# Patient Record
Sex: MALE | Race: WHITE | NOT HISPANIC OR LATINO
[De-identification: names, ages, dates, MRNs, and addresses within clinical notes are randomized per-mention and may not be internally consistent; named-entity substitution may affect disease eponyms.]

---

## 2017-10-09 PROBLEM — Z00.00 ENCOUNTER FOR PREVENTIVE HEALTH EXAMINATION: Status: ACTIVE | Noted: 2017-10-09

## 2017-10-16 ENCOUNTER — FORM ENCOUNTER (OUTPATIENT)
Age: 62
End: 2017-10-16

## 2017-10-17 ENCOUNTER — APPOINTMENT (OUTPATIENT)
Dept: RADIOLOGY | Facility: CLINIC | Age: 62
End: 2017-10-17

## 2017-10-17 ENCOUNTER — OUTPATIENT (OUTPATIENT)
Dept: OUTPATIENT SERVICES | Facility: HOSPITAL | Age: 62
LOS: 1 days | End: 2017-10-17
Payer: COMMERCIAL

## 2017-10-17 ENCOUNTER — APPOINTMENT (OUTPATIENT)
Dept: ORTHOPEDIC SURGERY | Facility: CLINIC | Age: 62
End: 2017-10-17
Payer: COMMERCIAL

## 2017-10-17 VITALS — BODY MASS INDEX: 32.9 KG/M2 | HEIGHT: 71 IN | RESPIRATION RATE: 16 BRPM | WEIGHT: 235 LBS

## 2017-10-17 DIAGNOSIS — Z86.39 PERSONAL HISTORY OF OTHER ENDOCRINE, NUTRITIONAL AND METABOLIC DISEASE: ICD-10-CM

## 2017-10-17 DIAGNOSIS — Z82.62 FAMILY HISTORY OF OSTEOPOROSIS: ICD-10-CM

## 2017-10-17 DIAGNOSIS — Z87.39 PERSONAL HISTORY OF OTHER DISEASES OF THE MUSCULOSKELETAL SYSTEM AND CONNECTIVE TISSUE: ICD-10-CM

## 2017-10-17 DIAGNOSIS — Z80.3 FAMILY HISTORY OF MALIGNANT NEOPLASM OF BREAST: ICD-10-CM

## 2017-10-17 DIAGNOSIS — Z82.69 FAMILY HISTORY OF OTHER DISEASES OF THE MUSCULOSKELETAL SYSTEM AND CONNECTIVE TISSUE: ICD-10-CM

## 2017-10-17 DIAGNOSIS — Z86.79 PERSONAL HISTORY OF OTHER DISEASES OF THE CIRCULATORY SYSTEM: ICD-10-CM

## 2017-10-17 DIAGNOSIS — Z86.69 PERSONAL HISTORY OF OTHER DISEASES OF THE NERVOUS SYSTEM AND SENSE ORGANS: ICD-10-CM

## 2017-10-17 PROCEDURE — 73030 X-RAY EXAM OF SHOULDER: CPT | Mod: 26,LT

## 2017-10-17 PROCEDURE — 99213 OFFICE O/P EST LOW 20 MIN: CPT

## 2017-10-17 RX ORDER — VALSARTAN 40 MG/1
TABLET, COATED ORAL
Refills: 0 | Status: ACTIVE | COMMUNITY

## 2017-10-17 RX ORDER — ALPRAZOLAM 2 MG/1
TABLET ORAL
Refills: 0 | Status: ACTIVE | COMMUNITY

## 2018-05-15 ENCOUNTER — APPOINTMENT (OUTPATIENT)
Dept: ORTHOPEDIC SURGERY | Facility: CLINIC | Age: 63
End: 2018-05-15
Payer: COMMERCIAL

## 2018-05-15 VITALS — HEIGHT: 71 IN | WEIGHT: 219 LBS | BODY MASS INDEX: 30.66 KG/M2

## 2018-05-15 PROCEDURE — 99214 OFFICE O/P EST MOD 30 MIN: CPT

## 2018-05-15 RX ORDER — HYDROCODONE BITARTRATE AND ACETAMINOPHEN 7.5; 3 MG/1; MG/1
TABLET ORAL
Refills: 0 | Status: ACTIVE | COMMUNITY

## 2018-05-15 RX ORDER — AMITRIPTYLINE HYDROCHLORIDE 75 MG/1
TABLET, FILM COATED ORAL
Refills: 0 | Status: ACTIVE | COMMUNITY

## 2018-07-03 ENCOUNTER — APPOINTMENT (OUTPATIENT)
Dept: ORTHOPEDIC SURGERY | Facility: CLINIC | Age: 63
End: 2018-07-03
Payer: COMMERCIAL

## 2018-07-03 VITALS — WEIGHT: 219 LBS | BODY MASS INDEX: 30.66 KG/M2 | HEIGHT: 71 IN

## 2018-07-03 PROCEDURE — 99214 OFFICE O/P EST MOD 30 MIN: CPT

## 2018-07-19 ENCOUNTER — TRANSCRIPTION ENCOUNTER (OUTPATIENT)
Age: 63
End: 2018-07-19

## 2018-07-25 ENCOUNTER — APPOINTMENT (OUTPATIENT)
Age: 63
End: 2018-07-25
Payer: COMMERCIAL

## 2018-07-25 PROCEDURE — 64718 REVISE ULNAR NERVE AT ELBOW: CPT | Mod: RT

## 2018-08-03 ENCOUNTER — APPOINTMENT (OUTPATIENT)
Dept: ORTHOPEDIC SURGERY | Facility: CLINIC | Age: 63
End: 2018-08-03
Payer: COMMERCIAL

## 2018-08-03 VITALS — WEIGHT: 219 LBS | BODY MASS INDEX: 30.66 KG/M2 | HEIGHT: 71 IN

## 2018-08-03 PROCEDURE — 99024 POSTOP FOLLOW-UP VISIT: CPT

## 2018-09-04 ENCOUNTER — APPOINTMENT (OUTPATIENT)
Dept: ORTHOPEDIC SURGERY | Facility: CLINIC | Age: 63
End: 2018-09-04
Payer: COMMERCIAL

## 2018-09-04 VITALS — BODY MASS INDEX: 30.66 KG/M2 | WEIGHT: 219 LBS | HEIGHT: 71 IN

## 2018-09-04 PROCEDURE — 99024 POSTOP FOLLOW-UP VISIT: CPT

## 2018-09-04 RX ORDER — OXYCODONE AND ACETAMINOPHEN 5; 325 MG/1; MG/1
5-325 TABLET ORAL
Qty: 40 | Refills: 0 | Status: DISCONTINUED | COMMUNITY
Start: 2018-07-17 | End: 2018-09-04

## 2018-12-11 ENCOUNTER — APPOINTMENT (OUTPATIENT)
Dept: ORTHOPEDIC SURGERY | Facility: CLINIC | Age: 63
End: 2018-12-11
Payer: COMMERCIAL

## 2018-12-11 VITALS — WEIGHT: 219 LBS | HEIGHT: 71 IN | BODY MASS INDEX: 30.66 KG/M2

## 2018-12-11 PROCEDURE — 99212 OFFICE O/P EST SF 10 MIN: CPT

## 2019-04-01 ENCOUNTER — FORM ENCOUNTER (OUTPATIENT)
Age: 64
End: 2019-04-01

## 2019-04-02 ENCOUNTER — OUTPATIENT (OUTPATIENT)
Dept: OUTPATIENT SERVICES | Facility: HOSPITAL | Age: 64
LOS: 1 days | End: 2019-04-02
Payer: COMMERCIAL

## 2019-04-02 ENCOUNTER — APPOINTMENT (OUTPATIENT)
Dept: RADIOLOGY | Facility: CLINIC | Age: 64
End: 2019-04-02

## 2019-04-02 ENCOUNTER — APPOINTMENT (OUTPATIENT)
Dept: ORTHOPEDIC SURGERY | Facility: CLINIC | Age: 64
End: 2019-04-02
Payer: COMMERCIAL

## 2019-04-02 VITALS — BODY MASS INDEX: 30.8 KG/M2 | HEIGHT: 71 IN | WEIGHT: 220 LBS

## 2019-04-02 PROCEDURE — 73030 X-RAY EXAM OF SHOULDER: CPT

## 2019-04-02 PROCEDURE — 73030 X-RAY EXAM OF SHOULDER: CPT | Mod: 26,RT

## 2019-04-02 PROCEDURE — 99213 OFFICE O/P EST LOW 20 MIN: CPT

## 2019-04-02 NOTE — PHYSICAL EXAM
[de-identified] : The right shoulder has 140° passive forward elevation, 40° external rotation. There is pain at the extremes of motion and he has normal strength of external rotation. There is slight crepitus with passive rotation of the glenohumeral joint.\par \par The right elbow has full active and passive range of motion. He has full motor power of the right hand but he has obvious intrinsic atrophy which has not changed from the preoperative state.\par \par The less symptomatic left shoulder has 140° passive forward elevation, 40° external rotation. There is no crepitus and no pain with passive rotation of the left shoulder.

## 2019-04-02 NOTE — REASON FOR VISIT
[Follow-Up Visit] : a follow-up visit for [FreeTextEntry2] : Right Shoulder Pain for 1 year, Left Shoulder Pain for 12 years

## 2019-04-02 NOTE — HISTORY OF PRESENT ILLNESS
[de-identified] : Mr. Richardson visits us today 8 months following right anterior ulnar nerve transposition on 7-25-18.He states that the right arm feels quite a bit better and he has more strength in the forearm and hand but he continues to have decreased sensation in the ulnar distribution in the fourth and fifth digits. Overall he feels as though the right ulnar transposition surgery has helped him.\par \par He returns today complaining of increasing right shoulder pain neck compromise of sleep and activities of daily living. He takes Aleve and Tylenol which did not relieve his pain.\par \par He is 14 years status post left total shoulder arthroplasty and he states that the left shoulder is functioning well and he has no pain. However, he is limited in the amount that he can lift overhead which does cause pain.

## 2019-04-08 ENCOUNTER — FORM ENCOUNTER (OUTPATIENT)
Age: 64
End: 2019-04-08

## 2019-04-09 ENCOUNTER — APPOINTMENT (OUTPATIENT)
Dept: MRI IMAGING | Facility: CLINIC | Age: 64
End: 2019-04-09

## 2019-04-09 ENCOUNTER — OTHER (OUTPATIENT)
Age: 64
End: 2019-04-09

## 2019-04-09 ENCOUNTER — APPOINTMENT (OUTPATIENT)
Dept: ORTHOPEDIC SURGERY | Facility: CLINIC | Age: 64
End: 2019-04-09
Payer: COMMERCIAL

## 2019-04-09 ENCOUNTER — OUTPATIENT (OUTPATIENT)
Dept: OUTPATIENT SERVICES | Facility: HOSPITAL | Age: 64
LOS: 1 days | End: 2019-04-09
Payer: COMMERCIAL

## 2019-04-09 DIAGNOSIS — M19.012 PRIMARY OSTEOARTHRITIS, LEFT SHOULDER: ICD-10-CM

## 2019-04-09 PROCEDURE — 99214 OFFICE O/P EST MOD 30 MIN: CPT

## 2019-04-09 PROCEDURE — 73221 MRI JOINT UPR EXTREM W/O DYE: CPT

## 2019-04-09 PROCEDURE — 73221 MRI JOINT UPR EXTREM W/O DYE: CPT | Mod: 26,RT

## 2019-04-14 NOTE — REASON FOR VISIT
[Follow-Up Visit] : a follow-up visit for [FreeTextEntry2] : Right Shoulder Pain for 1 year, Left Shoulder Pain for 12 years.

## 2019-04-14 NOTE — PHYSICAL EXAM
[de-identified] : The right shoulder has 140° passive forward elevation, 40° external rotation. He has normal strength of external rotation and can fully actively raise the arm but this is painful. He has painful crepitus with passive rotation of the glenohumeral joint.

## 2019-04-14 NOTE — HISTORY OF PRESENT ILLNESS
[de-identified] : Pete returns today to discuss the indications for total shoulder arthroplasty. He had an MRI today that show advanced osteoarthritis of the right glenohumeral joint with an intact rotator cuff.\par \par He continues to have pain in the right shoulder the compromise of sleep and limits activities of daily living that require overhead use. He has had progressing pain for the last year unresponsive to a full nonoperative treatment program. Radiographs of the right shoulder show end-stage osteoarthritis.

## 2019-05-10 VITALS
DIASTOLIC BLOOD PRESSURE: 96 MMHG | TEMPERATURE: 99 F | SYSTOLIC BLOOD PRESSURE: 153 MMHG | WEIGHT: 216.27 LBS | HEART RATE: 73 BPM | HEIGHT: 71 IN | OXYGEN SATURATION: 96 % | RESPIRATION RATE: 20 BRPM

## 2019-05-10 NOTE — PRE-OP CHECKLIST - INTERNAL PROSTHESES
METAL HARDWARE-BILATERAL HIP METAL HARDWARE, CERVICAL SPINE METAL RODS, LEFT SHOULDER METAL PLATE, LEFT HAND PINKEY/yes(specify)

## 2019-05-10 NOTE — PRE-OP CHECKLIST - 1.
METAL HARDWARE-BILATERAL HIP METAL HARDWARE, CERVICAL SPINE METAL RODS, LEFT SHOULDER METAL PLATE, LEFT HAND PINKEY SCREWS AND LEFT ELBOW METAL PLATE

## 2019-05-10 NOTE — PATIENT PROFILE ADULT - NSPROIMPLANTSMEDDEV_GEN_A_NUR
Artificial joint Artificial joint/Bilateral metal hardware hips  cervical spine 0 metal rods  left shoulder- metal plate  left hand pinky- metal screws  left elbow- metal plate

## 2019-05-13 ENCOUNTER — INPATIENT (INPATIENT)
Facility: HOSPITAL | Age: 64
LOS: 0 days | Discharge: HOME | DRG: 483 | End: 2019-05-14
Attending: ORTHOPAEDIC SURGERY | Admitting: ORTHOPAEDIC SURGERY
Payer: COMMERCIAL

## 2019-05-13 ENCOUNTER — APPOINTMENT (OUTPATIENT)
Dept: ORTHOPEDIC SURGERY | Facility: HOSPITAL | Age: 64
End: 2019-05-13

## 2019-05-13 DIAGNOSIS — I10 ESSENTIAL (PRIMARY) HYPERTENSION: ICD-10-CM

## 2019-05-13 DIAGNOSIS — Z98.890 OTHER SPECIFIED POSTPROCEDURAL STATES: Chronic | ICD-10-CM

## 2019-05-13 DIAGNOSIS — E78.49 OTHER HYPERLIPIDEMIA: ICD-10-CM

## 2019-05-13 DIAGNOSIS — M19.011 PRIMARY OSTEOARTHRITIS, RIGHT SHOULDER: ICD-10-CM

## 2019-05-13 DIAGNOSIS — F41.9 ANXIETY DISORDER, UNSPECIFIED: ICD-10-CM

## 2019-05-13 LAB
HCT VFR BLD CALC: 40.7 % — SIGNIFICANT CHANGE UP (ref 39–50)
HGB BLD-MCNC: 13.9 G/DL — SIGNIFICANT CHANGE UP (ref 13–17)
MCHC RBC-ENTMCNC: 30.3 PG — SIGNIFICANT CHANGE UP (ref 27–34)
MCHC RBC-ENTMCNC: 34.2 GM/DL — SIGNIFICANT CHANGE UP (ref 32–36)
MCV RBC AUTO: 88.7 FL — SIGNIFICANT CHANGE UP (ref 80–100)
NRBC # BLD: 0 /100 WBCS — SIGNIFICANT CHANGE UP (ref 0–0)
PLATELET # BLD AUTO: 224 K/UL — SIGNIFICANT CHANGE UP (ref 150–400)
RBC # BLD: 4.59 M/UL — SIGNIFICANT CHANGE UP (ref 4.2–5.8)
RBC # FLD: 13.6 % — SIGNIFICANT CHANGE UP (ref 10.3–14.5)
WBC # BLD: 16.36 K/UL — HIGH (ref 3.8–10.5)
WBC # FLD AUTO: 16.36 K/UL — HIGH (ref 3.8–10.5)

## 2019-05-13 PROCEDURE — 20902 REMOVAL OF BONE FOR GRAFT: CPT

## 2019-05-13 PROCEDURE — 23472 RECONSTRUCT SHOULDER JOINT: CPT | Mod: RT

## 2019-05-13 PROCEDURE — 73020 X-RAY EXAM OF SHOULDER: CPT | Mod: 26,RT

## 2019-05-13 RX ORDER — ACETAMINOPHEN 500 MG
1000 TABLET ORAL EVERY 6 HOURS
Refills: 0 | Status: DISCONTINUED | OUTPATIENT
Start: 2019-05-13 | End: 2019-05-14

## 2019-05-13 RX ORDER — CEFAZOLIN SODIUM 1 G
2000 VIAL (EA) INJECTION EVERY 8 HOURS
Refills: 0 | Status: DISCONTINUED | OUTPATIENT
Start: 2019-05-13 | End: 2019-05-13

## 2019-05-13 RX ORDER — LOSARTAN POTASSIUM 100 MG/1
100 TABLET, FILM COATED ORAL DAILY
Refills: 0 | Status: DISCONTINUED | OUTPATIENT
Start: 2019-05-13 | End: 2019-05-13

## 2019-05-13 RX ORDER — SENNA PLUS 8.6 MG/1
2 TABLET ORAL AT BEDTIME
Refills: 0 | Status: DISCONTINUED | OUTPATIENT
Start: 2019-05-13 | End: 2019-05-14

## 2019-05-13 RX ORDER — ALPRAZOLAM 0.25 MG
0.5 TABLET ORAL THREE TIMES A DAY
Refills: 0 | Status: DISCONTINUED | OUTPATIENT
Start: 2019-05-13 | End: 2019-05-14

## 2019-05-13 RX ORDER — LOSARTAN POTASSIUM 100 MG/1
100 TABLET, FILM COATED ORAL DAILY
Refills: 0 | Status: DISCONTINUED | OUTPATIENT
Start: 2019-05-13 | End: 2019-05-14

## 2019-05-13 RX ORDER — OXYCODONE HYDROCHLORIDE 5 MG/1
10 TABLET ORAL EVERY 4 HOURS
Refills: 0 | Status: DISCONTINUED | OUTPATIENT
Start: 2019-05-13 | End: 2019-05-14

## 2019-05-13 RX ORDER — DOCUSATE SODIUM 100 MG
100 CAPSULE ORAL THREE TIMES A DAY
Refills: 0 | Status: DISCONTINUED | OUTPATIENT
Start: 2019-05-13 | End: 2019-05-14

## 2019-05-13 RX ORDER — LOSARTAN POTASSIUM 100 MG/1
1 TABLET, FILM COATED ORAL
Qty: 0 | Refills: 0 | DISCHARGE

## 2019-05-13 RX ORDER — BUPIVACAINE 13.3 MG/ML
20 INJECTION, SUSPENSION, LIPOSOMAL INFILTRATION ONCE
Refills: 0 | Status: DISCONTINUED | OUTPATIENT
Start: 2019-05-13 | End: 2019-05-14

## 2019-05-13 RX ORDER — KETOROLAC TROMETHAMINE 30 MG/ML
15 SYRINGE (ML) INJECTION EVERY 8 HOURS
Refills: 0 | Status: DISCONTINUED | OUTPATIENT
Start: 2019-05-13 | End: 2019-05-14

## 2019-05-13 RX ORDER — OXYCODONE HYDROCHLORIDE 5 MG/1
5 TABLET ORAL EVERY 4 HOURS
Refills: 0 | Status: DISCONTINUED | OUTPATIENT
Start: 2019-05-13 | End: 2019-05-14

## 2019-05-13 RX ORDER — ALPRAZOLAM 0.25 MG
1 TABLET ORAL
Qty: 0 | Refills: 0 | DISCHARGE

## 2019-05-13 RX ORDER — POLYETHYLENE GLYCOL 3350 17 G/17G
17 POWDER, FOR SOLUTION ORAL DAILY
Refills: 0 | Status: DISCONTINUED | OUTPATIENT
Start: 2019-05-13 | End: 2019-05-14

## 2019-05-13 RX ORDER — MORPHINE SULFATE 50 MG/1
4 CAPSULE, EXTENDED RELEASE ORAL
Refills: 0 | Status: DISCONTINUED | OUTPATIENT
Start: 2019-05-13 | End: 2019-05-14

## 2019-05-13 RX ORDER — SODIUM CHLORIDE 9 MG/ML
1000 INJECTION, SOLUTION INTRAVENOUS
Refills: 0 | Status: DISCONTINUED | OUTPATIENT
Start: 2019-05-13 | End: 2019-05-14

## 2019-05-13 RX ORDER — CEFAZOLIN SODIUM 1 G
2000 VIAL (EA) INJECTION EVERY 8 HOURS
Refills: 0 | Status: COMPLETED | OUTPATIENT
Start: 2019-05-13 | End: 2019-05-14

## 2019-05-13 RX ORDER — ALPRAZOLAM 0.25 MG
0.5 TABLET ORAL THREE TIMES A DAY
Refills: 0 | Status: DISCONTINUED | OUTPATIENT
Start: 2019-05-13 | End: 2019-05-13

## 2019-05-13 RX ORDER — ONDANSETRON 8 MG/1
4 TABLET, FILM COATED ORAL EVERY 6 HOURS
Refills: 0 | Status: DISCONTINUED | OUTPATIENT
Start: 2019-05-13 | End: 2019-05-14

## 2019-05-13 RX ADMIN — Medication 15 MILLIGRAM(S): at 21:15

## 2019-05-13 RX ADMIN — Medication 2000 MILLIGRAM(S): at 21:14

## 2019-05-13 RX ADMIN — Medication 100 MILLIGRAM(S): at 21:15

## 2019-05-13 RX ADMIN — Medication 15 MILLIGRAM(S): at 21:30

## 2019-05-13 NOTE — H&P ADULT - NSHPPHYSICALEXAM_GEN_ALL_CORE
MSK: + joint pain, right shoulder MSK: + joint pain, right shoulder  Sensation intact to bilateral UE distally, diminished throughout right hand. Motor Strength 5/5 to /interossei/triceps/biceps/deltoid bilaterally. AIN/PIN intact.   Skin warm and well perfused; no visible wounds/erythema/ecchymoses  Radial pulses 2+    Remainder of exam per medical clearance note

## 2019-05-13 NOTE — H&P ADULT - PROBLEM SELECTOR PLAN 1
Admit to Orthopaedic Service.  Presents today for elective right TSR  Pt medically stable and cleared for procedure today by  Admit to Orthopaedic Service.  Presents today for elective right TSR  Pt medically stable and cleared for procedure today by Dr. Skinner

## 2019-05-13 NOTE — H&P ADULT - NSHPLABSRESULTS_GEN_ALL_CORE
Preop CBC, BMP, PT/PTT/INR - WNL per medical clearance   Preop EKG - Afib - reviewed by medical clearance   Echo April 2017 EF 65-70%

## 2019-05-13 NOTE — PROGRESS NOTE ADULT - SUBJECTIVE AND OBJECTIVE BOX
Ortho Post Op Check    Procedure: R NETO  Surgeon: Dariana    Pt comfortable without complaints, pain controlled  Denies CP, SOB, N/V, numbness/tingling     Vital Signs Last 24 Hrs  T(C): 35.8 (05-13-19 @ 20:10), Max: 35.8 (05-13-19 @ 20:10)  T(F): 96.5 (05-13-19 @ 20:10), Max: 96.5 (05-13-19 @ 20:10)  HR: 98 (05-13-19 @ 20:10) (97 - 98)  BP: 139/99 (05-13-19 @ 20:10) (139/99 - 141/90)  BP(mean): --  RR: 15 (05-13-19 @ 20:10) (14 - 15)  SpO2: 94% (05-13-19 @ 19:39) (94% - 94%)  AVSS    General: Pt Alert and oriented, NAD  DSG C/D/I, sling intact  Pulses: 2+ rad  Sensation:  SILT med/uln (rad returning from block)   Motor: wiggling fingers but block still in effect                          13.9   16.36 )-----------( 224      ( 13 May 2019 16:49 )             40.7       Post-op X-Ray: prosthesis intact     A/P: 64yMale s/p R TSA   - f/u Neuro exam   - Stable  - Pain Control  - DVT ppx: SCDs  - Post op abx: Ancef   - PT, WBS:  NWB RUE     Ortho Pager [phone]

## 2019-05-13 NOTE — CONSULT NOTE ADULT - PROBLEM SELECTOR RECOMMENDATION 9
5 Discussed with house staff and nursing staff.  Pain management, PT, incentive spirometer, DVT prophylaxis, repeat labs and discharge planning.

## 2019-05-13 NOTE — H&P ADULT - HISTORY OF PRESENT ILLNESS
64M c/o right shoulder pain x       Present for elective right total shoulder replacement 64M c/o right shoulder pain x 1 year. Pt denies preceding trauma/injury. Pt states his shoulder pain is localized and he takes vicodin as needed for pain control. He endorses constant numbness/tingling of the 4th and 5th digits of his right hand. Denies weakness of bilateral upper extremities. Pt does not ambulate with an assistive device at baseline. Denies DVT hx; denies CP, SOB, N/V, tactile fevers.       Present for elective right total shoulder replacement

## 2019-05-13 NOTE — CONSULT NOTE ADULT - SUBJECTIVE AND OBJECTIVE BOX
HPI:  64 year old male complains of moderate right shoulder pain for 1 year. He denies preceding trauma/injury and states his shoulder pain is localized and he takes vicodin as needed for pain control. He endorses constant numbness/tingling of the 4th and 5th digits of his right hand and denies weakness of bilateral MRI confirmed osteoarthritis of his right shoulder.  Patient s/p  elective right total shoulder replacement today wth moderate right shoulder pain an malaise.    PAST MEDICAL & SURGICAL HISTORY:  Other hyperlipidemia  Anxiety  Hypertension  H/O hand surgery: 2009  History of spinal surgery: 2006  H/O shoulder surgery: left 2005  History of hip surgery: noemy thr- 2003,2013      REVIEW OF SYSTEMS    General:  malaise	  Skin/Breast: normal  Ophthalmologic: negative  ENMT:	normal  Respiratory and Thorax: normal  Cardiovascular:	normal  Gastrointestinal:	normal  Genitourinary:	normal  Musculoskeletal:	right shoulder pain  Neurological:	normal  Psychiatric:	normal  Hematology/Lymphatics:	negative  Endocrine:	negative  Allergic/Immunologic:	negative      MEDICATIONS:  Xanax 0.5 mg oral tablet: 1 tab(s) orally 3 times a day, As Needed  · 	Vicodin  · 	losartan 100 mg oral tablet: 1 tab(s) orally once a day    acetaminophen   Tablet .. 1000 milliGRAM(s) Oral every 6 hours  BUpivacaine liposome 1.3% Injectable (no eMAR) 20 milliLiter(s) Local Injection once  ceFAZolin  Injectable. 2000 milliGRAM(s) IV Push every 8 hours  docusate sodium 100 milliGRAM(s) Oral three times a day  ketorolac   Injectable 15 milliGRAM(s) IV Push every 8 hours  lactated ringers. 1000 milliLiter(s) (120 mL/Hr) IV Continuous <Continuous>      MEDICATIONS  (PRN):  ALPRAZolam 0.5 milliGRAM(s) Oral three times a day PRN for anxiety  aluminum hydroxide/magnesium hydroxide/simethicone Suspension 30 milliLiter(s) Oral four times a day PRN Indigestion  morphine  - Injectable 4 milliGRAM(s) IV Push every 3 hours PRN Severe Pain (7 - 10)  ondansetron Injectable 4 milliGRAM(s) IV Push every 6 hours PRN Nausea and/or Vomiting  oxyCODONE    IR 5 milliGRAM(s) Oral every 4 hours PRN Mild Pain (1 - 3)  oxyCODONE    IR 10 milliGRAM(s) Oral every 4 hours PRN Moderate Pain (4 - 6)  senna 2 Tablet(s) Oral at bedtime PRN Constipation    Allergies    No Known Allergies    SOCIAL HISTORY: no cigs social alcohol    FAMILY HISTORY: non contributory    PHYSICAL EXAM:     Vital Signs Last 24 Hrs  T(C): 35.6 (13 May 2019 19:39), Max: 36.7 (13 May 2019 17:13)  T(F): 96 (13 May 2019 19:39), Max: 98 (13 May 2019 17:13)  HR: 97 (13 May 2019 19:39) (70 - 97)  BP: 141/90 (13 May 2019 19:39) (119/82 - 151/84)  BP(mean): 104 (13 May 2019 17:45) (97 - 108)  RR: 14 (13 May 2019 19:39) (12 - 20)  SpO2: 94% (13 May 2019 19:39) (94% - 97%)    Constitutional: WDWNM  Eyes: conj pale  ENMT: negative  Neck: supple  Breasts: not examined   Back: negative  Respiratory: clear to P&A  Cardiovascular: no MRGT or H  Gastrointestinal: normal bowel sounds  Genitourinary: neg  Rectal: not examined  Extremities: normal  Vascular: normal  Neurological: normal  Skin: negative  Lymph Nodes: negative  Musculoskeletal:   decreased ROM  right shoulder  Psychiatric: anxiety      LABS:                        13.9   16.36 )-----------( 224      ( 13 May 2019 16:49 )             40.7

## 2019-05-13 NOTE — H&P ADULT - NSICDXPASTSURGICALHX_GEN_ALL_CORE_FT
PAST SURGICAL HISTORY:  H/O hand surgery 2009    H/O shoulder surgery left 2005    History of hip surgery noemy thr- 2003,2013    History of spinal surgery 2006

## 2019-05-14 ENCOUNTER — TRANSCRIPTION ENCOUNTER (OUTPATIENT)
Age: 64
End: 2019-05-14

## 2019-05-14 VITALS
TEMPERATURE: 98 F | SYSTOLIC BLOOD PRESSURE: 129 MMHG | RESPIRATION RATE: 15 BRPM | HEART RATE: 100 BPM | DIASTOLIC BLOOD PRESSURE: 81 MMHG | OXYGEN SATURATION: 94 %

## 2019-05-14 LAB
ANION GAP SERPL CALC-SCNC: 10 MMOL/L — SIGNIFICANT CHANGE UP (ref 5–17)
BUN SERPL-MCNC: 19 MG/DL — SIGNIFICANT CHANGE UP (ref 7–23)
CALCIUM SERPL-MCNC: 9 MG/DL — SIGNIFICANT CHANGE UP (ref 8.4–10.5)
CHLORIDE SERPL-SCNC: 104 MMOL/L — SIGNIFICANT CHANGE UP (ref 96–108)
CO2 SERPL-SCNC: 25 MMOL/L — SIGNIFICANT CHANGE UP (ref 22–31)
CREAT SERPL-MCNC: 1.03 MG/DL — SIGNIFICANT CHANGE UP (ref 0.5–1.3)
GLUCOSE SERPL-MCNC: 127 MG/DL — HIGH (ref 70–99)
HCT VFR BLD CALC: 33.5 % — LOW (ref 39–50)
HGB BLD-MCNC: 11.1 G/DL — LOW (ref 13–17)
MCHC RBC-ENTMCNC: 29.5 PG — SIGNIFICANT CHANGE UP (ref 27–34)
MCHC RBC-ENTMCNC: 33.1 GM/DL — SIGNIFICANT CHANGE UP (ref 32–36)
MCV RBC AUTO: 89.1 FL — SIGNIFICANT CHANGE UP (ref 80–100)
NRBC # BLD: 0 /100 WBCS — SIGNIFICANT CHANGE UP (ref 0–0)
PLATELET # BLD AUTO: 218 K/UL — SIGNIFICANT CHANGE UP (ref 150–400)
POTASSIUM SERPL-MCNC: 4.4 MMOL/L — SIGNIFICANT CHANGE UP (ref 3.5–5.3)
POTASSIUM SERPL-SCNC: 4.4 MMOL/L — SIGNIFICANT CHANGE UP (ref 3.5–5.3)
RBC # BLD: 3.76 M/UL — LOW (ref 4.2–5.8)
RBC # FLD: 13.4 % — SIGNIFICANT CHANGE UP (ref 10.3–14.5)
SODIUM SERPL-SCNC: 139 MMOL/L — SIGNIFICANT CHANGE UP (ref 135–145)
WBC # BLD: 15.14 K/UL — HIGH (ref 3.8–10.5)
WBC # FLD AUTO: 15.14 K/UL — HIGH (ref 3.8–10.5)

## 2019-05-14 PROCEDURE — 86900 BLOOD TYPING SEROLOGIC ABO: CPT

## 2019-05-14 PROCEDURE — 73020 X-RAY EXAM OF SHOULDER: CPT

## 2019-05-14 PROCEDURE — 86850 RBC ANTIBODY SCREEN: CPT

## 2019-05-14 PROCEDURE — 76000 FLUOROSCOPY <1 HR PHYS/QHP: CPT

## 2019-05-14 PROCEDURE — 36415 COLL VENOUS BLD VENIPUNCTURE: CPT

## 2019-05-14 PROCEDURE — 80048 BASIC METABOLIC PNL TOTAL CA: CPT

## 2019-05-14 PROCEDURE — C1713: CPT

## 2019-05-14 PROCEDURE — 85027 COMPLETE CBC AUTOMATED: CPT

## 2019-05-14 PROCEDURE — 86901 BLOOD TYPING SEROLOGIC RH(D): CPT

## 2019-05-14 PROCEDURE — C1776: CPT

## 2019-05-14 RX ORDER — OXYCODONE HYDROCHLORIDE 5 MG/1
1 TABLET ORAL
Qty: 0 | Refills: 0 | DISCHARGE
Start: 2019-05-14

## 2019-05-14 RX ORDER — ACETAMINOPHEN 500 MG
2 TABLET ORAL
Qty: 0 | Refills: 0 | DISCHARGE
Start: 2019-05-14

## 2019-05-14 RX ORDER — DOCUSATE SODIUM 100 MG
1 CAPSULE ORAL
Qty: 0 | Refills: 0 | DISCHARGE
Start: 2019-05-14

## 2019-05-14 RX ADMIN — Medication 2000 MILLIGRAM(S): at 05:16

## 2019-05-14 RX ADMIN — Medication 100 MILLIGRAM(S): at 05:16

## 2019-05-14 RX ADMIN — Medication 15 MILLIGRAM(S): at 05:31

## 2019-05-14 RX ADMIN — Medication 15 MILLIGRAM(S): at 05:16

## 2019-05-14 NOTE — DISCHARGE NOTE NURSING/CASE MANAGEMENT/SOCIAL WORK - NSDCDPATPORTLINK_GEN_ALL_CORE
You can access the [facility] Patient Portal, offered by [facility], by registering with the following website: [email or web address]

## 2019-05-14 NOTE — DISCHARGE NOTE PROVIDER - CARE PROVIDER_API CALL
Porter Westbrook)  Orthopaedic Surgery  [address], [address] [place] [address]  Phone: [phone]  Fax: [phone]  Follow Up Time:

## 2019-05-14 NOTE — PROGRESS NOTE ADULT - SUBJECTIVE AND OBJECTIVE BOX
Orthopaedic Surgery Progress Note    Patient seen and examined. KATIE. Patient without complaints. Pain controlled. Denies CP, SOB, N/V, tactile fevers, calf pain.      Vital Signs Last 24 Hrs  T(C): 36.9 (14 May 2019 08:15), Max: 37.6 (14 May 2019 00:00)  T(F): 98.4 (14 May 2019 08:15), Max: 99.7 (14 May 2019 00:00)  HR: 100 (14 May 2019 08:15) (70 - 100)  BP: 129/81 (14 May 2019 08:15) (98/64 - 151/84)  BP(mean): 104 (13 May 2019 17:45) (97 - 108)  RR: 15 (14 May 2019 08:15) (12 - 20)  SpO2: 94% (14 May 2019 08:15) (94% - 97%)      Physical Exam:  Pt laying comfortably in bed, NAD.  Skin warm and well perfused, no visible erythema/ecchymoses.  Dressing C/D/I  Right wrist extension and elbow flexion remain in tact but limited 2/2 scalene block  Sensation intact to bilateral UE distally. AIN/PIN intact.   Radial pulses 2+    LABS                        11.1   15.14 )-----------( 218      ( 14 May 2019 05:54 )             33.5                                05-14    139  |  104  |  19  ----------------------------<  127<H>  4.4   |  25  |  1.03    Ca    9.0      14 May 2019 05:54          A/P: 64M POD #1 s/p right TSA    CONTINUE:        1. NWB RUE   2. DVT prophylaxis : SCDs  3. Pain Control as needed   4. Dispo:  Home today Orthopaedic Surgery Progress Note    Patient seen and examined. KATIE. Patient without complaints. Pain controlled. Denies CP, SOB, N/V, tactile fevers, calf pain.      Vital Signs Last 24 Hrs  T(C): 36.9 (14 May 2019 08:15), Max: 37.6 (14 May 2019 00:00)  T(F): 98.4 (14 May 2019 08:15), Max: 99.7 (14 May 2019 00:00)  HR: 100 (14 May 2019 08:15) (70 - 100)  BP: 129/81 (14 May 2019 08:15) (98/64 - 151/84)  BP(mean): 104 (13 May 2019 17:45) (97 - 108)  RR: 15 (14 May 2019 08:15) (12 - 20)  SpO2: 94% (14 May 2019 08:15) (94% - 97%)      Physical Exam:  Pt laying comfortably in bed, NAD.  Skin warm and well perfused, no visible erythema/ecchymoses.  Dressing C/D/I, RUE sling in place   Right wrist extension and elbow flexion remain in tact but limited 2/2 scalene block  Sensation intact to bilateral UE distally. AIN/PIN intact.   Radial pulses 2+    LABS                        11.1   15.14 )-----------( 218      ( 14 May 2019 05:54 )             33.5                                05-14    139  |  104  |  19  ----------------------------<  127<H>  4.4   |  25  |  1.03    Ca    9.0      14 May 2019 05:54          A/P: 64M POD #1 s/p right TSA    CONTINUE:        1. NWB RUE   2. DVT prophylaxis : SCDs  3. Pain Control as needed   4. Dispo:  Home today

## 2019-05-14 NOTE — PROGRESS NOTE ADULT - SUBJECTIVE AND OBJECTIVE BOX
HPI:  64 year old male complains of moderate right shoulder pain for 1 year. He denies preceding trauma/injury and states his shoulder pain is localized and he takes vicodin as needed for pain control. He endorses constant numbness/tingling of the 4th and 5th digits of his right hand and denies weakness of bilateral MRI confirmed osteoarthritis of his right shoulder.  Patient day #1 s/p  elective right total shoulder replacement with moderate right shoulder pain and malaise.    PAST MEDICAL & SURGICAL HISTORY:  Other hyperlipidemia  Anxiety  Hypertension  H/O hand surgery: 2009  History of spinal surgery: 2006  H/O shoulder surgery: left 2005  History of hip surgery: noemy thr- 2003,2013      MEDICATIONS  (STANDING):  acetaminophen   Tablet .. 1000 milliGRAM(s) Oral every 6 hours  BUpivacaine liposome 1.3% Injectable (no eMAR) 20 milliLiter(s) Local Injection once  docusate sodium 100 milliGRAM(s) Oral three times a day  ketorolac   Injectable 15 milliGRAM(s) IV Push every 8 hours  lactated ringers. 1000 milliLiter(s) (120 mL/Hr) IV Continuous <Continuous>  losartan 100 milliGRAM(s) Oral daily  polyethylene glycol 3350 17 Gram(s) Oral daily    MEDICATIONS  (PRN):  ALPRAZolam 0.5 milliGRAM(s) Oral three times a day PRN for anxiety  aluminum hydroxide/magnesium hydroxide/simethicone Suspension 30 milliLiter(s) Oral four times a day PRN Indigestion  morphine  - Injectable 4 milliGRAM(s) IV Push every 3 hours PRN Severe Pain (7 - 10)  ondansetron Injectable 4 milliGRAM(s) IV Push every 6 hours PRN Nausea and/or Vomiting  oxyCODONE    IR 5 milliGRAM(s) Oral every 4 hours PRN Mild Pain (1 - 3)  oxyCODONE    IR 10 milliGRAM(s) Oral every 4 hours PRN Moderate Pain (4 - 6)  senna 2 Tablet(s) Oral at bedtime PRN Constipation    Allergies    No Known Allergies    REVIEW OF SYSTEMS    General:  malaise	  Skin/Breast: normal  Ophthalmologic: negative  ENMT:	normal  Respiratory and Thorax: normal  Cardiovascular:	normal  Gastrointestinal:	normal  Genitourinary:	normal  Musculoskeletal:	 right shoulder pain  Neurological:	normal  Psychiatric:	normal  Hematology/Lymphatics:	 negative  Endocrine:	negative  Allergic/Immunologic:	negative      PHYSICAL EXAM:     Vital Signs Last 24 Hrs  T(C): 37.3 (14 May 2019 04:40), Max: 37.6 (14 May 2019 00:00)  T(F): 99.2 (14 May 2019 04:40), Max: 99.7 (14 May 2019 00:00)  HR: 93 (14 May 2019 04:40) (70 - 98)  BP: 116/81 (14 May 2019 04:40) (98/64 - 151/84)  BP(mean): 104 (13 May 2019 17:45) (97 - 108)  RR: 16 (14 May 2019 04:40) (12 - 20)  SpO2: 95% (14 May 2019 04:40) (94% - 97%)    Constitutional: WDWNM  Eyes: conj pale  ENMT: negative  Neck: supple  Breasts: not examined   Back: negative  Respiratory: clear to P&A  Cardiovascular: no MRGT or H  Gastrointestinal: normal bowel sounds  Genitourinary: neg  Rectal: not examined  Extremities: normal  Vascular: normal  Neurological: normal  Skin: negative  Lymph Nodes: negative  Musculoskeletal:   decreased ROM  right shoulder  Psychiatric: anxiety    LABS:                        13.9   16.36 )-----------( 224      ( 13 May 2019 16:49 )             40.7

## 2019-05-14 NOTE — PROGRESS NOTE ADULT - SUBJECTIVE AND OBJECTIVE BOX
S: Patient seen and examined. Doing well this AM. Pain reported but controlled. No acute events overnight. Block still in place    O:   Vital Signs Last 24 Hrs  T(C): 37.3 (14 May 2019 04:40), Max: 37.6 (14 May 2019 00:00)  T(F): 99.2 (14 May 2019 04:40), Max: 99.7 (14 May 2019 00:00)  HR: 93 (14 May 2019 04:40) (70 - 98)  BP: 116/81 (14 May 2019 04:40) (98/64 - 151/84)  BP(mean): 104 (13 May 2019 17:45) (97 - 108)  RR: 16 (14 May 2019 04:40) (12 - 20)  SpO2: 95% (14 May 2019 04:40) (94% - 97%)    Motor: intact WG/WF/WE/T/Deltoid, able to fire fingers actively BL  AIN/PIN/Ulnar nerve distribution intact BL  radial and ulnar artery well perfused BL  Dressing C/D/I

## 2019-05-14 NOTE — DISCHARGE NOTE PROVIDER - HOSPITAL COURSE
Admit to Orthopaedics for right total shoulder replacement     Perioperative Antibiotics    DVT prophylaxis    Pain Management

## 2019-05-14 NOTE — DISCHARGE NOTE PROVIDER - NSDCFUADDINST_GEN_ALL_CORE_FT
Activity as discussed with Dr. Westbrook. See attached instructions.  Take medications as prescribed to your pharmacy by Dr. Westbrook.  You may shower - dressing is water-resistant, no soaking in bathtubs.  Remove dressing after post op day 5-7, then leave incision open to air. Keep incision clean and dry.  Try to have regular bowel movements, take stool softener or laxative if necessary.  Swelling may travel all the way down leg to foot, this is normal and will subside in a few weeks.  Follow up with Dr. Westbrook to schedule an appt, if you have staples or sutures they will be removed in office.  Contact your doctor if you experience: fever greater than 101.5, chills, chest pain, difficulty breathing, redness or excessive drainage around the incision, other concerns.

## 2019-05-14 NOTE — PROGRESS NOTE ADULT - ASSESSMENT
A/P: 64y s/p Right TSA  -stable  -pain control  -PT/NWB RUE / sling  -diet as tolerated  -f/u labs  -disposition: home  -discussed with attending

## 2019-05-15 PROBLEM — E78.49 OTHER HYPERLIPIDEMIA: Chronic | Status: ACTIVE | Noted: 2019-05-13

## 2019-05-15 PROBLEM — I10 ESSENTIAL (PRIMARY) HYPERTENSION: Chronic | Status: ACTIVE | Noted: 2019-05-10

## 2019-05-15 PROBLEM — F41.9 ANXIETY DISORDER, UNSPECIFIED: Chronic | Status: ACTIVE | Noted: 2019-05-10

## 2019-05-16 DIAGNOSIS — M19.011 PRIMARY OSTEOARTHRITIS, RIGHT SHOULDER: ICD-10-CM

## 2019-05-16 DIAGNOSIS — F41.9 ANXIETY DISORDER, UNSPECIFIED: ICD-10-CM

## 2019-05-16 DIAGNOSIS — I10 ESSENTIAL (PRIMARY) HYPERTENSION: ICD-10-CM

## 2019-05-16 DIAGNOSIS — E78.5 HYPERLIPIDEMIA, UNSPECIFIED: ICD-10-CM

## 2019-05-23 ENCOUNTER — FORM ENCOUNTER (OUTPATIENT)
Age: 64
End: 2019-05-23

## 2019-05-24 ENCOUNTER — APPOINTMENT (OUTPATIENT)
Dept: ORTHOPEDIC SURGERY | Facility: CLINIC | Age: 64
End: 2019-05-24
Payer: COMMERCIAL

## 2019-05-24 ENCOUNTER — OUTPATIENT (OUTPATIENT)
Dept: OUTPATIENT SERVICES | Facility: HOSPITAL | Age: 64
LOS: 1 days | End: 2019-05-24
Payer: COMMERCIAL

## 2019-05-24 ENCOUNTER — APPOINTMENT (OUTPATIENT)
Dept: RADIOLOGY | Facility: CLINIC | Age: 64
End: 2019-05-24

## 2019-05-24 DIAGNOSIS — Z98.890 OTHER SPECIFIED POSTPROCEDURAL STATES: Chronic | ICD-10-CM

## 2019-05-24 PROCEDURE — 73030 X-RAY EXAM OF SHOULDER: CPT | Mod: 26,RT

## 2019-05-24 PROCEDURE — 99024 POSTOP FOLLOW-UP VISIT: CPT

## 2019-05-24 PROCEDURE — 73030 X-RAY EXAM OF SHOULDER: CPT

## 2019-05-24 RX ORDER — OXYCODONE AND ACETAMINOPHEN 5; 325 MG/1; MG/1
5-325 TABLET ORAL
Qty: 30 | Refills: 0 | Status: DISCONTINUED | COMMUNITY
Start: 2019-05-10 | End: 2019-05-24

## 2019-05-30 NOTE — HISTORY OF PRESENT ILLNESS
[de-identified] : Mr. Richardson visits us today 11 days following right total shoulder arthroplasty on 5-13-19

## 2019-05-30 NOTE — DISCUSSION/SUMMARY
[Medication Risks Reviewed] : Medication risks reviewed [Surgical risks reviewed] : Surgical risks reviewed [de-identified] : I attempted to aspirate the hematoma following a Betadine prep that the hematoma had already consolidated and there was no manjula blood to extract. Because of the swelling I decided not to remove the suture today and advised him to return in one week for suture removal when there is less swelling and pressure on the incision.\par \par I ordered and reviewed radiographs of the right shoulder today that show total shoulder arthroplasty in satisfactory position.\par \par \par Otherwise he is doing well. I will reevaluate him in one week.

## 2019-05-30 NOTE — PROCEDURE
[Aspiration] : Aspiration [Right] : on the right.   [Patient] : patient [Betadine] : Betadine [Ethyl Chloride Spray] : ethyl chloride spray was used as a topical anesthetic [Lateral] : lateral [___ mL Fluid] : [unfilled] mL of [18] : an 18-gauge [Bloody] : bloody [Bandage Applied] : a bandage [Tolerated Well] : The patient tolerated the procedure well [___ Week(s)] : in [unfilled] week(s) [de-identified] : Shoulder  [de-identified] : hematoma

## 2019-05-30 NOTE — CONSULT LETTER
[Dear  ___] : Dear  [unfilled], [FreeTextEntry1] : Today I had the pleasure of evaluating your very nice patient SUJATA ZAMUDIO who requested that I share my findings with you. I very much appreciate the referral. \par \par Please review my office note below and, needless to say, please call or email me with any questions or concerns.\par \par I appreciate the opportunity to participate in his care.\par \par SUJATA is doing very well  11 days following right total shoulder arthroplasty. I have included a copy of your operative dictation for your records.\par \par Sincerely,\par \par Porter Westbrook MD\par Director, Orthopaedic Surgery\par and Orthopaedic Strategic Initiatives \par [facility]\par Office: [phone]\par Cell: [phone]\par Email: [email or web address] Website: [email or web address] \par \par \par \par

## 2019-05-31 ENCOUNTER — APPOINTMENT (OUTPATIENT)
Dept: ORTHOPEDIC SURGERY | Facility: CLINIC | Age: 64
End: 2019-05-31
Payer: COMMERCIAL

## 2019-05-31 VITALS — WEIGHT: 220 LBS | BODY MASS INDEX: 30.8 KG/M2 | HEIGHT: 71 IN

## 2019-05-31 PROCEDURE — 99024 POSTOP FOLLOW-UP VISIT: CPT

## 2019-06-05 NOTE — PHYSICAL EXAM
[de-identified] : Pete has much less swelling in the right shoulder incision area and the incision is well healed without drainage or erythema. The suture was removed. Passive forward elevation 90°, external rotation 20°.

## 2019-06-05 NOTE — REASON FOR VISIT
[Follow-Up Visit] : a follow-up visit for [FreeTextEntry2] : Right Shoulder Pain for 1 year, Left Shoulder Pain for 12 years. \par

## 2019-06-05 NOTE — DISCUSSION/SUMMARY
[Medication Risks Reviewed] : Medication risks reviewed [Surgical risks reviewed] : Surgical risks reviewed [de-identified] : Doing well 2 weeks following right total shoulder arthroplasty. I instructed him in pendulum exercises and encouraged him to actively use the right hand below shoulder level for activities of daily living.\par \par He should return from my repeat evaluation in one month at which time forward elevation exercises will begin.

## 2019-06-05 NOTE — HISTORY OF PRESENT ILLNESS
[de-identified] : Mr. Richardson visits us today 18 days following right total shoulder arthroplasty on 5-13-19 \par  \par

## 2019-06-28 ENCOUNTER — APPOINTMENT (OUTPATIENT)
Dept: ORTHOPEDIC SURGERY | Facility: CLINIC | Age: 64
End: 2019-06-28
Payer: COMMERCIAL

## 2019-06-28 VITALS — HEIGHT: 71 IN | WEIGHT: 220 LBS | BODY MASS INDEX: 30.8 KG/M2

## 2019-06-28 PROCEDURE — 99024 POSTOP FOLLOW-UP VISIT: CPT

## 2019-07-01 NOTE — PHYSICAL EXAM
[de-identified] : The right shoulder has 150° passive forward elevation, 30° external rotation. He has good strength of external rotation.

## 2019-07-01 NOTE — DISCUSSION/SUMMARY
[Surgical risks reviewed] : Surgical risks reviewed [Medication Risks Reviewed] : Medication risks reviewed [de-identified] : Doing well 6 weeks following right total shoulder arthroplasty. I instructed him in rotator cuff strengthening exercises I gave him my home exercise sheet. He may now discontinue the use of the sling but avoid active raising of the right arm above shoulder level.\par \par I should reevaluate him in 6 weeks and repeat right shoulder radiographs at that time.

## 2019-07-01 NOTE — HISTORY OF PRESENT ILLNESS
[de-identified] : Mr. Richardson visits us today 6 weeks following right total shoulder arthroplasty on 5-13-19

## 2019-07-07 ENCOUNTER — TRANSCRIPTION ENCOUNTER (OUTPATIENT)
Age: 64
End: 2019-07-07

## 2019-08-06 ENCOUNTER — APPOINTMENT (OUTPATIENT)
Dept: ORTHOPEDIC SURGERY | Facility: CLINIC | Age: 64
End: 2019-08-06
Payer: COMMERCIAL

## 2019-08-06 DIAGNOSIS — G56.21 LESION OF ULNAR NERVE, RIGHT UPPER LIMB: ICD-10-CM

## 2019-08-06 PROCEDURE — 99024 POSTOP FOLLOW-UP VISIT: CPT

## 2019-08-06 NOTE — HISTORY OF PRESENT ILLNESS
[de-identified] : Mr. Richardson visits us today 3 months following right total shoulder arthroplasty on 5-13-19. He states that he has minimal pain in the right shoulder and has markedly improved function.

## 2019-08-06 NOTE — PHYSICAL EXAM
[de-identified] : The right shoulder has 160° passive forward elevation, 60°, external rotation. He can fully actively raise the arm and has good strength of external rotation.

## 2019-08-06 NOTE — CONSULT LETTER
[Dear  ___] : Dear  [unfilled], [FreeTextEntry1] : Today I had the pleasure of evaluating your very nice patient SUJATA ZAMUDIO who requested that I share my findings with you. I very much appreciate the referral. \par \par Please review my office note below and, needless to say, please call or email me with any questions or concerns.\par \par I appreciate the opportunity to participate in his care.\par \par SUJATA is doing very well  11 days following right total shoulder arthroplasty. I have included a copy of your operative dictation for your records.\par \par Sincerely,\par \par Porter Westbrook MD\par Director, Orthopaedic Surgery\par and Orthopaedic Strategic Initiatives \par [facility]\par Office: [phone]\par Cell: [phone]\par Email: [email or web address]: [email or web address] \par \par \par \par

## 2019-08-06 NOTE — DISCUSSION/SUMMARY
[Medication Risks Reviewed] : Medication risks reviewed [Surgical risks reviewed] : Surgical risks reviewed [de-identified] : Doing very well 3 months following right total shoulder arthroplasty. I reviewed with him a rotator cuff strengthening program and encouraged him to increase activities as comfort permits. He should return for my repeat evaluation in 3 months.\par \par Today his clavicle, right shoulder [employer] score is 90 on a scale of 100, indicating excellent shoulder function, and a marked improvement from his preoperative score of 30.

## 2019-11-08 ENCOUNTER — APPOINTMENT (OUTPATIENT)
Dept: ORTHOPEDIC SURGERY | Facility: CLINIC | Age: 64
End: 2019-11-08
Payer: COMMERCIAL

## 2019-11-08 VITALS — HEIGHT: 71 IN | WEIGHT: 225 LBS | BODY MASS INDEX: 31.5 KG/M2

## 2019-11-08 DIAGNOSIS — M19.011 PRIMARY OSTEOARTHRITIS, RIGHT SHOULDER: ICD-10-CM

## 2019-11-08 PROCEDURE — 99212 OFFICE O/P EST SF 10 MIN: CPT

## 2019-11-18 PROBLEM — M19.011 DEGENERATIVE JOINT DISEASE, SHOULDER, RIGHT: Status: ACTIVE | Noted: 2019-04-02

## 2020-10-26 NOTE — PATIENT PROFILE ADULT - NSPROGENOTHERPROVIDER_GEN_A_NUR
T(C): 36.9 (10-26-20 @ 08:30), Max: 36.9 (10-26-20 @ 08:30)  HR: 73 (10-26-20 @ 08:30) (73 - 73)  BP: 94/53 (10-26-20 @ 08:30) (94/53 - 94/53)  RR: 20 (10-26-20 @ 08:30) (20 - 20)  SpO2: 97% (10-26-20 @ 08:30) (97% - 97%)    GENERAL: patient appears well, no acute distress, appropriate, pleasant  EYES: sclera clear, no exudates  NECK: supple, soft, no thyromegaly noted  LUNGS: good air entry bilaterally, clear to auscultation, symmetric breath sounds, no wheezing or rhonchi appreciated  HEART: soft S1/S2, regular rate and rhythm, no murmurs noted, no lower extremity edema  GASTROINTESTINAL: abdomen is soft, slightly tender to palpation in RUQ and RLQ.  negative nails sign, negative obturator sign, negative psoas sign  INTEGUMENT: good skin turgor, no lesions noted  MUSCULOSKELETAL: no clubbing or cyanosis, no obvious deformity  NEUROLOGIC: awake, alert, oriented x3, good muscle tone in 4 extremities, no obvious sensory deficits  PSYCHIATRIC: mood is good, affect is congruent, linear and logical thought process  HEME/LYMPH: no palpable supraclavicular nodules, no obvious ecchymosis or petechiae none

## 2022-06-03 NOTE — PHYSICAL EXAM
[de-identified] : The right shoulder incision is healed. Passive forward elevation 90°, external rotation 20°. There is a 5 x 5 cm hematoma in the inferior pole of the incision which is nontender and nonerythematous. Standing/Walking/Toileting

## 2022-10-17 NOTE — PRE-OP CHECKLIST - AICD PRESENT
Called Dr. Valencia's office back and spoke with SANYA Sloan clarifying she needed the cardiac clearance, RN confirmed that is what she was looking for and provided fax number of [phone].  Faxed cardiac clearance and EKG to Dr. Garcia's office at [phone]. Fax confirmation received. Faxed cardiac clearance and EKG to Ortho Surgeon   Dr. Paul Bingham at [phone]. Fax confirmation received.   Dr. Lawrence office number: [phone].   no